# Patient Record
Sex: FEMALE | Race: WHITE | NOT HISPANIC OR LATINO | Employment: UNEMPLOYED | URBAN - METROPOLITAN AREA
[De-identification: names, ages, dates, MRNs, and addresses within clinical notes are randomized per-mention and may not be internally consistent; named-entity substitution may affect disease eponyms.]

---

## 2017-12-03 PROBLEM — J44.1 COPD EXACERBATION: Status: ACTIVE | Noted: 2017-12-03

## 2017-12-03 PROBLEM — J44.1 COPD WITH ACUTE EXACERBATION: Status: ACTIVE | Noted: 2017-12-03

## 2017-12-03 PROBLEM — R79.89 TROPONIN LEVEL ELEVATED: Status: ACTIVE | Noted: 2017-12-03

## 2017-12-03 PROBLEM — E87.5 HYPERKALEMIA: Status: ACTIVE | Noted: 2017-12-03

## 2017-12-03 PROBLEM — N18.30 CKD (CHRONIC KIDNEY DISEASE) STAGE 3, GFR 30-59 ML/MIN: Status: ACTIVE | Noted: 2017-12-03

## 2017-12-03 PROBLEM — I10 HTN (HYPERTENSION): Status: ACTIVE | Noted: 2017-12-03

## 2017-12-04 PROBLEM — R73.9 HYPERGLYCEMIA: Status: ACTIVE | Noted: 2017-12-04

## 2017-12-04 PROBLEM — E05.90 HYPERTHYROIDISM: Status: ACTIVE | Noted: 2017-12-04

## 2017-12-04 PROBLEM — E44.0 MODERATE PROTEIN-CALORIE MALNUTRITION: Status: ACTIVE | Noted: 2017-12-04

## 2017-12-05 PROBLEM — E87.8 ELECTROLYTE ABNORMALITY: Status: ACTIVE | Noted: 2017-12-05

## 2017-12-05 PROBLEM — R06.02 SHORTNESS OF BREATH: Status: ACTIVE | Noted: 2017-12-05

## 2017-12-05 PROBLEM — I25.119 ATHEROSCLEROTIC HEART DISEASE OF NATIVE CORONARY ARTERY WITH ANGINA PECTORIS: Status: ACTIVE | Noted: 2017-12-05

## 2017-12-06 PROBLEM — I34.0 MITRAL REGURGITATION: Status: ACTIVE | Noted: 2017-12-06

## 2017-12-06 PROBLEM — I33.0 MITRAL VALVE VEGETATION: Status: ACTIVE | Noted: 2017-12-06

## 2017-12-07 ENCOUNTER — TELEPHONE (OUTPATIENT)
Dept: CARDIOLOGY | Facility: CLINIC | Age: 82
End: 2017-12-07

## 2017-12-07 NOTE — TELEPHONE ENCOUNTER
----- Message from Fabian Godoy sent at 12/7/2017 11:57 AM CST -----  Contact: [facility] Case Master called in to make a two week Hospital Follow Up appointment with Dr Navarrete.  There weren't any appointments available until January 12th.  Can you please call the patient's daughter, Galilea Romano, back at [phone].  Thank you

## 2019-04-17 ENCOUNTER — OFFICE VISIT (OUTPATIENT)
Dept: PSYCHIATRY | Facility: CLINIC | Age: 84
End: 2019-04-17
Payer: MEDICARE

## 2019-04-17 VITALS
HEART RATE: 76 BPM | WEIGHT: 109.88 LBS | SYSTOLIC BLOOD PRESSURE: 133 MMHG | DIASTOLIC BLOOD PRESSURE: 66 MMHG | RESPIRATION RATE: 14 BRPM | BODY MASS INDEX: 23.71 KG/M2 | HEIGHT: 57 IN

## 2019-04-17 DIAGNOSIS — F41.1 GENERALIZED ANXIETY DISORDER: ICD-10-CM

## 2019-04-17 PROCEDURE — 99999 PR PBB SHADOW E&M-EST. PATIENT-LVL III: CPT | Mod: PBBFAC,,, | Performed by: PSYCHOLOGIST

## 2019-04-17 PROCEDURE — 99999 PR PBB SHADOW E&M-EST. PATIENT-LVL III: ICD-10-PCS | Mod: PBBFAC,,, | Performed by: PSYCHOLOGIST

## 2019-04-17 PROCEDURE — 90792 PR PSYCHIATRIC DIAGNOSTIC EVALUATION W/MEDICAL SERVICES: ICD-10-PCS | Mod: S$GLB,,, | Performed by: PSYCHOLOGIST

## 2019-04-17 PROCEDURE — 90792 PSYCH DIAG EVAL W/MED SRVCS: CPT | Mod: S$GLB,,, | Performed by: PSYCHOLOGIST

## 2019-04-17 RX ORDER — CETIRIZINE HYDROCHLORIDE 10 MG/1
10 TABLET ORAL DAILY
COMMUNITY

## 2019-04-17 RX ORDER — ACETAMINOPHEN 500 MG
5000 TABLET ORAL DAILY
COMMUNITY

## 2019-04-17 RX ORDER — DOCUSATE SODIUM 100 MG/1
100 CAPSULE, LIQUID FILLED ORAL 2 TIMES DAILY PRN
COMMUNITY

## 2019-04-17 RX ORDER — MONTELUKAST SODIUM 10 MG/1
10 TABLET ORAL NIGHTLY
COMMUNITY
End: 2020-02-27

## 2019-04-17 RX ORDER — IPRATROPIUM BROMIDE 42 UG/1
2 SPRAY, METERED NASAL 3 TIMES DAILY
COMMUNITY

## 2019-04-17 RX ORDER — AZELASTINE 1 MG/ML
1 SPRAY, METERED NASAL 2 TIMES DAILY
COMMUNITY

## 2019-04-17 RX ORDER — ELECTROLYTES/DEXTROSE
1 SOLUTION, ORAL ORAL DAILY
COMMUNITY
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
## 2019-04-17 NOTE — LETTER
April 17, 2019        Sage Lugo MD  [address] [facility] [address]             [facility]  [address]  [facility] [address]  Phone: [phone]   Patient: Kourtney Morales   MR Number: [id number]   YOB: 1928   Date of Visit: 4/17/2019       Dear Dr. Lugo:    Thank you for referring Kourtney Morales to me for evaluation. Below are the relevant portions of my assessment and plan of care.    Plan is to start Xanax 0.25mg once Q HS PRN. Patient's family agree to hold and administer (or have staff administer) only when needed when pt is getting in to bed to reduce risk of falls.     Can you let me know you reviewed and concur with future treatment plans based on your knowledge of her by faxing the response CONCUR to [phone] or emailing CONCUR to [email or web address]? Also, can call me directly. I will hold off on prescription changes until I hear from you.     If you have questions, please do not hesitate to call me. I look forward to following Kourtney Valentine along with you.    Sincerely,      Rohan Earl, PhD           CC  No Recipients

## 2019-04-18 RX ORDER — ALPRAZOLAM 0.25 MG/1
0.25 TABLET ORAL NIGHTLY PRN
Qty: 15 TABLET | Refills: 1 | Status: SHIPPED | OUTPATIENT
Start: 2019-04-18 | End: 2019-05-18

## 2019-04-24 ENCOUNTER — TELEPHONE (OUTPATIENT)
Dept: PSYCHIATRY | Facility: CLINIC | Age: 84
End: 2019-04-24

## 2019-04-24 NOTE — TELEPHONE ENCOUNTER
Returned call to pt daughter and advised of Dr Earl response.  Pt daughter verbalized understanding and states she will contact pt PCP.

## 2019-04-24 NOTE — TELEPHONE ENCOUNTER
It's their choice. If they want to start/restart Aricept they will need to ask their PCP. It will not likely be helpful for the symptoms that led to her evaluation with me. Thanks!

## 2019-04-24 NOTE — TELEPHONE ENCOUNTER
Pt daughter Galilea Romano called to advise that she spoke with her family and they have decided against the xanax prn.  They would like to try the Aricept 10mg once again if that is an option.  Please advise.  Galilea Romano [phone].

## 2019-06-12 PROBLEM — M81.0 SENILE OSTEOPOROSIS: Status: ACTIVE | Noted: 2019-06-12

## 2020-06-04 PROBLEM — J43.2 CENTRILOBULAR EMPHYSEMA: Status: ACTIVE | Noted: 2020-06-04

## 2020-06-04 PROBLEM — M41.80 DEXTROSCOLIOSIS: Status: ACTIVE | Noted: 2020-06-04

## 2020-06-04 PROBLEM — J38.01 VOCAL CORD PARALYSIS, UNILATERAL PARTIAL: Status: ACTIVE | Noted: 2020-06-04

## 2020-06-04 PROBLEM — C34.12 MALIGNANT NEOPLASM OF UPPER LOBE OF LEFT LUNG: Status: ACTIVE | Noted: 2020-06-04

## 2020-06-04 PROBLEM — C20 RECTAL CANCER: Status: ACTIVE | Noted: 2020-06-04
